# Patient Record
Sex: MALE | Race: WHITE | NOT HISPANIC OR LATINO | URBAN - METROPOLITAN AREA
[De-identification: names, ages, dates, MRNs, and addresses within clinical notes are randomized per-mention and may not be internally consistent; named-entity substitution may affect disease eponyms.]

---

## 2018-09-21 ENCOUNTER — OUTPATIENT (OUTPATIENT)
Dept: OUTPATIENT SERVICES | Facility: HOSPITAL | Age: 28
LOS: 1 days | End: 2018-09-21

## 2018-09-21 VITALS
SYSTOLIC BLOOD PRESSURE: 120 MMHG | WEIGHT: 233.03 LBS | RESPIRATION RATE: 14 BRPM | DIASTOLIC BLOOD PRESSURE: 70 MMHG | HEIGHT: 76 IN | TEMPERATURE: 98 F | HEART RATE: 66 BPM

## 2018-09-21 DIAGNOSIS — S62.325A DISPLACED FRACTURE OF SHAFT OF FOURTH METACARPAL BONE, LEFT HAND, INITIAL ENCOUNTER FOR CLOSED FRACTURE: ICD-10-CM

## 2018-09-21 NOTE — H&P PST ADULT - PMH
Displaced fracture of shaft of fourth metacarpal bone, left hand, initial encounter for closed fracture    Osteoarthritis  Left foot

## 2018-09-21 NOTE — H&P PST ADULT - NEGATIVE OPHTHALMOLOGIC SYMPTOMS
no blurred vision R/no discharge R/no blurred vision L/no pain R/no irritation R/no loss of vision L/no loss of vision R/no diplopia/no photophobia/no irritation L/no discharge L/no pain L

## 2018-09-21 NOTE — H&P PST ADULT - REASON FOR ADMISSION
"  I broke my left hand, ring finger, yesterday" "  I broke my left ring finger, yesterday while playing soccer"

## 2018-09-21 NOTE — H&P PST ADULT - MUSCULOSKELETAL COMMENTS
Left foot osteoarthritis Left foot osteoarthritis, left 4th metacarpal injury Displaced fracture of shaft of fourth metacarpal bone, left hand, initial encounter for closed fracture , Pre op diagnosis- Displaced fracture of shaft of fourth metacarpal bone, left hand, initial encounter for closed fracture ,

## 2018-09-21 NOTE — H&P PST ADULT - PROBLEM SELECTOR PROBLEM 1
Displaced fracture of shaft of fourth metacarpal bone, left hand, initial encounter for closed fracture

## 2018-09-21 NOTE — H&P PST ADULT - HISTORY OF PRESENT ILLNESS
28 year old male with a history of left ring finger injury acquired on 9/20/2018 while playing soccer. Patient is s/p an x-ray of the hand left hand resulted in displaced fracture of shaft of fourth metacarpal bone, left hand. Patient was referred to Dr. Brown. Patient is scheduled for open reduction internal fixation left 4th metacarpal scheduled on 9/24/2018. 28 year old male with a history of left ring finger injury acquired on 9/20/2018 while playing soccer. Patient is s/p an x-ray of the left hand resulted in displaced fracture of shaft of fourth metacarpal bone, left hand. Patient was referred to Dr. Brown. Patient is scheduled for open reduction internal fixation left 4th metacarpal scheduled on 9/24/2018.

## 2018-09-21 NOTE — H&P PST ADULT - NSANTHOSAYNRD_GEN_A_CORE
No. RAYMOND screening performed.  STOP BANG Legend: 0-2 = LOW Risk; 3-4 = INTERMEDIATE Risk; 5-8 = HIGH Risk

## 2018-09-21 NOTE — H&P PST ADULT - PROBLEM SELECTOR PLAN 1
Patient is scheduled for open reduction internal fixation left 4th metacarpal scheduled on 9/24/2018.     Preop instructions, pepcid provided. Pt stated understanding.

## 2018-09-21 NOTE — H&P PST ADULT - ACTIVITY
Walking, play soccer 1-2 times a week, climbing up and down stairs Walking, playing soccer 1-2 times a week, climbing up and down stairs

## 2018-09-21 NOTE — H&P PST ADULT - NEGATIVE NEUROLOGICAL SYMPTOMS
no confusion/no transient paralysis/no focal seizures/no weakness/no paresthesias/no loss of consciousness/no vertigo/no generalized seizures/no difficulty walking/no headache/no hemiparesis/no facial palsy/no tremors

## 2018-09-21 NOTE — H&P PST ADULT - NEGATIVE GENERAL SYMPTOMS
no polydipsia/no weight gain/no fever/no polyphagia/no chills/no sweating/no anorexia/no weight loss/no polyuria

## 2018-09-21 NOTE — H&P PST ADULT - NEGATIVE ENMT SYMPTOMS
no ear pain/no nasal discharge/no throat pain/no nose bleeds/no recurrent cold sores/no dry mouth/no vertigo/no hearing difficulty/no nasal obstruction/no post-nasal discharge/no abnormal taste sensation/no gum bleeding/no nasal congestion/no dysphagia

## 2018-09-21 NOTE — H&P PST ADULT - NEGATIVE GASTROINTESTINAL SYMPTOMS
no diarrhea/no change in bowel habits/no jaundice/no nausea/no vomiting/no constipation/no abdominal pain

## 2018-09-21 NOTE — H&P PST ADULT - NEGATIVE GENERAL GENITOURINARY SYMPTOMS
no incontinence/no nocturia/no hematuria/no flank pain L/no bladder infections/no dysuria/no flank pain R/no urine discoloration/normal urinary frequency/no urinary hesitancy

## 2018-09-21 NOTE — H&P PST ADULT - RS GEN PE MLT RESP DETAILS PC
no rhonchi/no wheezes/airway patent/breath sounds equal/clear to auscultation bilaterally/no rales/respirations non-labored/good air movement

## 2018-09-23 ENCOUNTER — TRANSCRIPTION ENCOUNTER (OUTPATIENT)
Age: 28
End: 2018-09-23

## 2018-09-24 ENCOUNTER — OUTPATIENT (OUTPATIENT)
Dept: OUTPATIENT SERVICES | Facility: HOSPITAL | Age: 28
LOS: 1 days | Discharge: HOME | End: 2018-09-24

## 2018-09-24 VITALS
OXYGEN SATURATION: 99 % | HEART RATE: 69 BPM | DIASTOLIC BLOOD PRESSURE: 88 MMHG | TEMPERATURE: 97 F | SYSTOLIC BLOOD PRESSURE: 119 MMHG

## 2018-09-24 VITALS
WEIGHT: 233.03 LBS | RESPIRATION RATE: 15 BRPM | TEMPERATURE: 97 F | HEIGHT: 76 IN | DIASTOLIC BLOOD PRESSURE: 87 MMHG | HEART RATE: 76 BPM | OXYGEN SATURATION: 100 % | SYSTOLIC BLOOD PRESSURE: 130 MMHG

## 2018-09-24 DIAGNOSIS — S62.325A DISPLACED FRACTURE OF SHAFT OF FOURTH METACARPAL BONE, LEFT HAND, INITIAL ENCOUNTER FOR CLOSED FRACTURE: ICD-10-CM

## 2018-09-24 RX ORDER — OXYCODONE HYDROCHLORIDE 5 MG/1
1 TABLET ORAL
Qty: 30 | Refills: 0 | OUTPATIENT
Start: 2018-09-24

## 2018-09-24 RX ORDER — ONDANSETRON 8 MG/1
1 TABLET, FILM COATED ORAL
Qty: 12 | Refills: 0 | OUTPATIENT
Start: 2018-09-24

## 2018-09-24 NOTE — BRIEF OPERATIVE NOTE - ANTIBIOTIC PROTOCOL
For information on Fall & Injury Prevention, visit [email or web address]
Followed protocol

## 2018-09-24 NOTE — BRIEF OPERATIVE NOTE - PRE-OP DX
Closed displaced fracture of shaft of fourth metacarpal bone of left hand, initial encounter  09/24/2018    Active  Fritz Schaffer

## 2018-09-24 NOTE — ASU DISCHARGE PLAN (ADULT/PEDIATRIC). - MEDICATION SUMMARY - MEDICATIONS TO TAKE
I will START or STAY ON the medications listed below when I get home from the hospital:    No medications per patient  -- Indication: For home meds    acetaminophen-oxyCODONE 325 mg-5 mg oral tablet  -- 1-2 tab(s) by mouth every 4-6 hours as needed MDD:12  -- Caution federal law prohibits the transfer of this drug to any person other  than the person for whom it was prescribed.  May cause drowsiness.  Alcohol may intensify this effect.  Use care when operating dangerous machinery.  This prescription cannot be refilled.  This product contains acetaminophen.  Do not use  with any other product containing acetaminophen to prevent possible liver damage.  Using more of this medication than prescribed may cause serious breathing problems.    -- Indication: For pain    ondansetron 4 mg oral tablet  -- 1 tab(s) by mouth every 6 hours, As Needed  nausea/vomiting   -- Indication: For nausea

## 2018-09-24 NOTE — BRIEF OPERATIVE NOTE - PROCEDURE
<<-----Click on this checkbox to enter Procedure ORIF fracture of metacarpal bone  09/24/2018    Active  DSTAL

## 2018-09-24 NOTE — ASU DISCHARGE PLAN (ADULT/PEDIATRIC). - NOTIFY
Swelling that continues/Persistent Nausea and Vomiting/Numbness, color, or temperature change to extremity/Pain not relieved by Medications/Bleeding that does not stop/Fever greater than 101/Unable to Urinate

## 2019-10-23 ENCOUNTER — TRANSCRIPTION ENCOUNTER (OUTPATIENT)
Age: 29
End: 2019-10-23

## 2022-10-12 NOTE — H&P PST ADULT - BACK
soft/tender.../nondistended/POSITIVE FOR GUARDING/no organomegaly/[facility]
detailed exam

## 2023-04-04 ENCOUNTER — NON-APPOINTMENT (OUTPATIENT)
Age: 33
End: 2023-04-04

## 2023-04-06 PROBLEM — M19.90 UNSPECIFIED OSTEOARTHRITIS, UNSPECIFIED SITE: Chronic | Status: ACTIVE | Noted: 2018-09-21

## 2023-04-06 PROBLEM — S62.325A DISPLACED FRACTURE OF SHAFT OF FOURTH METACARPAL BONE, LEFT HAND, INITIAL ENCOUNTER FOR CLOSED FRACTURE: Chronic | Status: ACTIVE | Noted: 2018-09-21

## 2023-04-10 ENCOUNTER — APPOINTMENT (OUTPATIENT)
Dept: PULMONOLOGY | Facility: CLINIC | Age: 33
End: 2023-04-10

## 2023-04-10 PROBLEM — Z00.00 ENCOUNTER FOR PREVENTIVE HEALTH EXAMINATION: Status: ACTIVE | Noted: 2023-04-10

## 2025-06-29 ENCOUNTER — NON-APPOINTMENT (OUTPATIENT)
Age: 35
End: 2025-06-29